# Patient Record
Sex: FEMALE | Race: WHITE | Employment: OTHER | URBAN - NONMETROPOLITAN AREA
[De-identification: names, ages, dates, MRNs, and addresses within clinical notes are randomized per-mention and may not be internally consistent; named-entity substitution may affect disease eponyms.]

---

## 2022-03-16 LAB
BASOPHILS ABSOLUTE: 0 /ΜL
BASOPHILS RELATIVE PERCENT: 0.1 %
EOSINOPHILS ABSOLUTE: 0 /ΜL
EOSINOPHILS RELATIVE PERCENT: 0 %
HCT VFR BLD CALC: 40 % (ref 36–46)
HEMOGLOBIN: 13.4 G/DL (ref 12–16)
LYMPHOCYTES ABSOLUTE: 1 /ΜL
LYMPHOCYTES RELATIVE PERCENT: 6.4 %
MCH RBC QN AUTO: 28.8 PG
MCHC RBC AUTO-ENTMCNC: 33.2 G/DL
MCV RBC AUTO: 86.9 FL
MONOCYTES ABSOLUTE: 0.9 /ΜL
MONOCYTES RELATIVE PERCENT: 5.8 %
NEUTROPHILS ABSOLUTE: 13.8 /ΜL
NEUTROPHILS RELATIVE PERCENT: 87.5 %
PDW BLD-RTO: 13 %
PLATELET # BLD: 267 K/ΜL
PMV BLD AUTO: 10 FL
RBC # BLD: 4.65 10^6/ΜL
WBC # BLD: 15.7 10^3/ML

## 2022-06-20 LAB
BASOPHILS ABSOLUTE: 0 /ΜL
BASOPHILS RELATIVE PERCENT: 0.5 %
BUN BLDV-MCNC: 22 MG/DL
CALCIUM SERPL-MCNC: 9.5 MG/DL
CHLORIDE BLD-SCNC: 106 MMOL/L
CO2: 24 MMOL/L
CREAT SERPL-MCNC: 1.32 MG/DL
EOSINOPHILS ABSOLUTE: 0.1 /ΜL
EOSINOPHILS RELATIVE PERCENT: 1.9 %
GFR CALCULATED: 43
GLUCOSE BLD-MCNC: 92 MG/DL
HCT VFR BLD CALC: 36.7 % (ref 36–46)
HEMOGLOBIN: 12.5 G/DL (ref 12–16)
LYMPHOCYTES ABSOLUTE: 2.3 /ΜL
LYMPHOCYTES RELATIVE PERCENT: 29.8 %
MCH RBC QN AUTO: 29.4 PG
MCHC RBC AUTO-ENTMCNC: 34 G/DL
MCV RBC AUTO: 86.3 FL
MONOCYTES ABSOLUTE: 0.6 /ΜL
MONOCYTES RELATIVE PERCENT: 7.8 %
NEUTROPHILS ABSOLUTE: 4.7 /ΜL
NEUTROPHILS RELATIVE PERCENT: 60 %
PDW BLD-RTO: 13.9 %
PLATELET # BLD: 200 K/ΜL
PMV BLD AUTO: 8.2 FL
POTASSIUM SERPL-SCNC: 3.9 MMOL/L
RBC # BLD: 4.25 10^6/ΜL
SODIUM BLD-SCNC: 139 MMOL/L
WBC # BLD: 7.8 10^3/ML

## 2022-08-25 NOTE — PROGRESS NOTES
[address] [place] [id number]  Dept: [phone]  Dept Fax: [phone]  Loc: [phone]    Estell Nissen is a 70 y.o. female who presents today for her medical conditions/complaints as noted below. Chief Complaint   Patient presents with    New Patient       HPI:     Patient is here in the office today to establish care. Previous PCP:   Specialists: hematology - osteoporosis, ENT/audiology - left ear hearing loss/hearing aid, pulmonology - COPD, counseling - mood/bipolar disorder, ophthalmology -cataracts, orthopedics -knee osteoarthritis/steroid injections, podiatry, GI - colonoscopy (due 11/2022)    Past medical history: osteoporosis, COPD, bipolar disorder/mood disorder, left ear hearing loss, osteoarthritis, cataracts    Social history:   - Tobacco: denies; former smoker  - Alcohol: former use; none currently  - Marijuana: denies  - Other drugs: denies  - Household: lives at home by herself    Preventative care:   Mammogram: UTD per patient; completes through [facility]  Pap exam: declines  Colonoscopy: follows with GI - due 11/2022  DEXA scan: completes through hematology/oncology office    Anxiety/depression/bipolar disorder:  Patient states that her moods are really well controlled on medication. Has had issues with mood swings and anger when not on medication. She is taking Zoloft 50 mg daily daily without issues - needs medication refill. Denies SI or HI at this time. Does follow with counseling routinely. Osteoporosis:  Patient follows with oncology/hematology center for Reclast infusions. Has DEXA scan scheduled soon for monitoring. COPD:  States that her breathing is stable. Follows with pulmonology in [place]. No new concerns at this time. Hearing issues:  States that she has a history of left-sided hearing loss and does not wear a hearing aid in her left ear.   Follows with ENT and audiology for this issue. Osteoarthritis:  Has a history of knee osteoarthritis for which she receives steroid injections. States that this does well at controlling her pain. Follows with orthopedics (Dr. Alyssa Banks). Cataracts: Follows with ophthalmology (Dr. Armando Bang) for routine management of her cataracts. Past Medical History:   Diagnosis Date    Osteoporosis       Past Surgical History:   Procedure Laterality Date    CARPAL TUNNEL RELEASE Bilateral     CATARACT EXTRACTION Bilateral        History reviewed. No pertinent family history. Social History     Tobacco Use    Smoking status: Former     Packs/day: 1.00     Years: 15.00     Pack years: 15.00     Types: Cigarettes     Start date: 8/15/1967     Quit date: 8/2/2012     Years since quitting: 10.0    Smokeless tobacco: Never   Substance Use Topics    Alcohol use: Not on file      Current Outpatient Medications   Medication Sig Dispense Refill    albuterol sulfate HFA (PROVENTIL;VENTOLIN;PROAIR) 108 (90 Base) MCG/ACT inhaler inhale 2 puffs by mouth and INTO THE LUNGS every 6 hours      ANORO ELLIPTA 62.5-25 MCG/INH AEPB inhaler inhale 1 puff by mouth and INTO THE LUNGS once daily      aspirin 81 MG chewable tablet Take 81 mg by mouth daily      Cholecalciferol (VITAMIN D3) 50 MCG (2000 UT) CAPS Take by mouth      b complex vitamins capsule Take 1 capsule by mouth daily      acetaminophen (TYLENOL) 325 MG tablet Take 650 mg by mouth every 6 hours as needed for Pain      olopatadine (PATANOL) 0.1 % ophthalmic solution 1 drop 2 times daily      meclizine (ANTIVERT) 25 MG tablet Take 25 mg by mouth 3 times daily as needed      sertraline (ZOLOFT) 50 MG tablet Take 1 tablet by mouth daily 90 tablet 0     No current facility-administered medications for this visit.      No Known Allergies    Health Maintenance   Topic Date Due    Annual Wellness Visit (AWV)  Never done    COVID-19 Vaccine (1) Never done    Depression Screen  Never done    Hepatitis C screen  Never done    DTaP/Tdap/Td vaccine (1 - Tdap) Never done    Lipids  Never done    Colorectal Cancer Screen  Never done    Breast cancer screen  Never done    Shingles vaccine (1 of 2) Never done    DEXA (modify frequency per FRAX score)  Never done    Pneumococcal 65+ years Vaccine (1 - PCV) Never done    Flu vaccine (1) 09/01/2022    Hepatitis A vaccine  Aged Out    Hepatitis B vaccine  Aged Out    Hib vaccine  Aged Out    Meningococcal (ACWY) vaccine  Aged Out       Subjective:      Review of Systems   Constitutional:  Negative for chills and fever. Respiratory:  Negative for cough and shortness of breath. Cardiovascular:  Negative for chest pain and leg swelling. Gastrointestinal:  Negative for abdominal pain, nausea and vomiting. Neurological:  Negative for dizziness and light-headedness. Psychiatric/Behavioral:  Negative for agitation, dysphoric mood, self-injury and suicidal ideas. The patient is not nervous/anxious. Objective:     Physical Exam  Vitals and nursing note reviewed. Constitutional:       General: She is not in acute distress. Appearance: Normal appearance. She is overweight. She is not ill-appearing or toxic-appearing. HENT:      Head: Normocephalic and atraumatic. Right Ear: External ear normal.      Left Ear: External ear normal.      Ears:      Comments: Hearing aid present in left ear     Nose: Nose normal.      Mouth/Throat:      Lips: Pink. Mouth: Mucous membranes are moist.      Pharynx: Oropharynx is clear. Uvula midline. Comments: edentulous  Eyes:      General: Lids are normal.      Conjunctiva/sclera: Conjunctivae normal.      Pupils: Pupils are equal, round, and reactive to light. Neck:      Thyroid: No thyromegaly. Cardiovascular:      Rate and Rhythm: Normal rate and regular rhythm. Heart sounds: Normal heart sounds. No murmur heard.   Pulmonary:      Effort: Pulmonary effort is normal.      Breath sounds: Normal breath sounds and air entry. No wheezing, rhonchi or rales. Musculoskeletal:      Cervical back: Neck supple. Right lower leg: No edema. Left lower leg: No edema. Lymphadenopathy:      Cervical: No cervical adenopathy. Skin:     General: Skin is warm and dry. Capillary Refill: Capillary refill takes less than 2 seconds. Neurological:      General: No focal deficit present. Mental Status: She is alert and oriented to person, place, and time. Gait: Gait is intact. Psychiatric:         Mood and Affect: Mood and affect normal.         Behavior: Behavior is cooperative. /60   Pulse 68   Temp 96.9 °F (36.1 °C) (Infrared)   Resp 18   Ht 5' 5\" (1.651 m)   Wt 155 lb 3.2 oz (70.4 kg)   LMP  (LMP Unknown)   SpO2 99%   BMI 25.83 kg/m²     Assessment/Plan:   Bárbara Leo was seen today for new patient. Diagnoses and all orders for this visit:    Chronic obstructive pulmonary disease, unspecified COPD type ([facility]  Comments:  Chronic, stable. Continue as needed albuterol and daily Anoro Ellipta. Follow-up with pulmonology as scheduled. Plan to obtain basic lab work as ordered below. Orders:  -     CBC with Auto Differential; Future  -     Comprehensive Metabolic Panel; Future    Mood disorder (HCC)  Comments:  History of bipolar disorder, though moods are stable on sertraline 50 mg daily. No SI or HI. Continue medication as prescribed. Follow-up with counseling as scheduled. Orders:  -     sertraline (ZOLOFT) 50 MG tablet; Take 1 tablet by mouth daily    Primary osteoarthritis of both knees  Comments:  Chronic, stable. Follow-up with orthopedics as scheduled for corticosteroid injections. Age-related osteoporosis without current pathological fracture  Comments:  Established, stable. Follow-up with hematology/oncology for Reclast infusions. Has upcoming DEXA scan scheduled. Cataract of both eyes, unspecified cataract type  Comments:  Established, stable.   Follow-up with ophthalmology as scheduled. Hearing loss of left ear, unspecified hearing loss type  Comments:  Established, stable. Wears hearing aid in left ear. Follow-up with ENT and audiology as scheduled. Screening for HIV (human immunodeficiency virus)  Comments:  HIV screening lab ordered. Orders:  -     HIV Screen; Future    Need for hepatitis C screening test  Comments:  Hepatitis C screening lab ordered. Orders:  -     Hepatitis C Antibody; Future    Lipid screening  Comments:  Fasting lipid screening ordered. Orders:  -     Lipid Panel; Future    Follow-up with specialist as detailed above. Return in about 4 weeks (around 9/27/2022) for lab review.     Electronically signed by Jimmy Keyes DO on 8/30/2022 at 2:09 PM

## 2022-08-30 ENCOUNTER — OFFICE VISIT (OUTPATIENT)
Dept: FAMILY MEDICINE CLINIC | Age: 72
End: 2022-08-30
Payer: MEDICARE

## 2022-08-30 VITALS
OXYGEN SATURATION: 99 % | WEIGHT: 155.2 LBS | TEMPERATURE: 96.9 F | RESPIRATION RATE: 18 BRPM | HEIGHT: 65 IN | DIASTOLIC BLOOD PRESSURE: 60 MMHG | BODY MASS INDEX: 25.86 KG/M2 | SYSTOLIC BLOOD PRESSURE: 122 MMHG | HEART RATE: 68 BPM

## 2022-08-30 DIAGNOSIS — Z11.4 SCREENING FOR HIV (HUMAN IMMUNODEFICIENCY VIRUS): ICD-10-CM

## 2022-08-30 DIAGNOSIS — H91.92 HEARING LOSS OF LEFT EAR, UNSPECIFIED HEARING LOSS TYPE: ICD-10-CM

## 2022-08-30 DIAGNOSIS — Z11.59 NEED FOR HEPATITIS C SCREENING TEST: ICD-10-CM

## 2022-08-30 DIAGNOSIS — Z13.220 LIPID SCREENING: ICD-10-CM

## 2022-08-30 DIAGNOSIS — M17.0 PRIMARY OSTEOARTHRITIS OF BOTH KNEES: ICD-10-CM

## 2022-08-30 DIAGNOSIS — F39 MOOD DISORDER (HCC): ICD-10-CM

## 2022-08-30 DIAGNOSIS — H26.9 CATARACT OF BOTH EYES, UNSPECIFIED CATARACT TYPE: ICD-10-CM

## 2022-08-30 DIAGNOSIS — J44.9 CHRONIC OBSTRUCTIVE PULMONARY DISEASE, UNSPECIFIED COPD TYPE (HCC): Primary | ICD-10-CM

## 2022-08-30 DIAGNOSIS — Z87.891 FORMER SMOKER: ICD-10-CM

## 2022-08-30 DIAGNOSIS — M81.0 AGE-RELATED OSTEOPOROSIS WITHOUT CURRENT PATHOLOGICAL FRACTURE: ICD-10-CM

## 2022-08-30 PROCEDURE — G0444 DEPRESSION SCREEN ANNUAL: HCPCS | Performed by: STUDENT IN AN ORGANIZED HEALTH CARE EDUCATION/TRAINING PROGRAM

## 2022-08-30 PROCEDURE — 3023F SPIROM DOC REV: CPT | Performed by: STUDENT IN AN ORGANIZED HEALTH CARE EDUCATION/TRAINING PROGRAM

## 2022-08-30 PROCEDURE — 1090F PRES/ABSN URINE INCON ASSESS: CPT | Performed by: STUDENT IN AN ORGANIZED HEALTH CARE EDUCATION/TRAINING PROGRAM

## 2022-08-30 PROCEDURE — G8417 CALC BMI ABV UP PARAM F/U: HCPCS | Performed by: STUDENT IN AN ORGANIZED HEALTH CARE EDUCATION/TRAINING PROGRAM

## 2022-08-30 PROCEDURE — G8400 PT W/DXA NO RESULTS DOC: HCPCS | Performed by: STUDENT IN AN ORGANIZED HEALTH CARE EDUCATION/TRAINING PROGRAM

## 2022-08-30 PROCEDURE — 99204 OFFICE O/P NEW MOD 45 MIN: CPT | Performed by: STUDENT IN AN ORGANIZED HEALTH CARE EDUCATION/TRAINING PROGRAM

## 2022-08-30 PROCEDURE — 1036F TOBACCO NON-USER: CPT | Performed by: STUDENT IN AN ORGANIZED HEALTH CARE EDUCATION/TRAINING PROGRAM

## 2022-08-30 PROCEDURE — 3017F COLORECTAL CA SCREEN DOC REV: CPT | Performed by: STUDENT IN AN ORGANIZED HEALTH CARE EDUCATION/TRAINING PROGRAM

## 2022-08-30 PROCEDURE — G8427 DOCREV CUR MEDS BY ELIG CLIN: HCPCS | Performed by: STUDENT IN AN ORGANIZED HEALTH CARE EDUCATION/TRAINING PROGRAM

## 2022-08-30 PROCEDURE — 1123F ACP DISCUSS/DSCN MKR DOCD: CPT | Performed by: STUDENT IN AN ORGANIZED HEALTH CARE EDUCATION/TRAINING PROGRAM

## 2022-08-30 RX ORDER — UMECLIDINIUM BROMIDE AND VILANTEROL TRIFENATATE 62.5; 25 UG/1; UG/1
POWDER RESPIRATORY (INHALATION)
COMMUNITY
Start: 2022-08-01

## 2022-08-30 RX ORDER — ASPIRIN 81 MG/1
81 TABLET, CHEWABLE ORAL DAILY
COMMUNITY

## 2022-08-30 RX ORDER — MECLIZINE HYDROCHLORIDE 25 MG/1
25 TABLET ORAL 3 TIMES DAILY PRN
COMMUNITY

## 2022-08-30 RX ORDER — METHOCARBAMOL 750 MG/1
TABLET, FILM COATED ORAL
COMMUNITY
Start: 2022-06-20 | End: 2022-08-30

## 2022-08-30 RX ORDER — METHYLPREDNISOLONE 4 MG/1
TABLET ORAL
COMMUNITY
Start: 2022-06-20 | End: 2022-08-30

## 2022-08-30 RX ORDER — ACETAMINOPHEN 325 MG/1
650 TABLET ORAL EVERY 6 HOURS PRN
COMMUNITY

## 2022-08-30 RX ORDER — ACETAMINOPHEN 160 MG
TABLET,DISINTEGRATING ORAL
COMMUNITY

## 2022-08-30 RX ORDER — HYDROXYZINE PAMOATE 25 MG/1
CAPSULE ORAL
COMMUNITY
Start: 2022-07-05 | End: 2022-08-30

## 2022-08-30 RX ORDER — OLOPATADINE HYDROCHLORIDE 1 MG/ML
1 SOLUTION/ DROPS OPHTHALMIC 2 TIMES DAILY
COMMUNITY

## 2022-08-30 RX ORDER — CARIPRAZINE 1.5 MG/1
CAPSULE, GELATIN COATED ORAL
COMMUNITY
Start: 2022-07-06 | End: 2022-08-30

## 2022-08-30 RX ORDER — VITAMIN B COMPLEX
1 CAPSULE ORAL DAILY
COMMUNITY

## 2022-08-30 RX ORDER — ALBUTEROL SULFATE 90 UG/1
AEROSOL, METERED RESPIRATORY (INHALATION)
COMMUNITY
Start: 2022-07-05

## 2022-08-30 SDOH — ECONOMIC STABILITY: FOOD INSECURITY: WITHIN THE PAST 12 MONTHS, YOU WORRIED THAT YOUR FOOD WOULD RUN OUT BEFORE YOU GOT MONEY TO BUY MORE.: NEVER TRUE

## 2022-08-30 SDOH — ECONOMIC STABILITY: FOOD INSECURITY: WITHIN THE PAST 12 MONTHS, THE FOOD YOU BOUGHT JUST DIDN'T LAST AND YOU DIDN'T HAVE MONEY TO GET MORE.: NEVER TRUE

## 2022-08-30 ASSESSMENT — PATIENT HEALTH QUESTIONNAIRE - PHQ9
SUM OF ALL RESPONSES TO PHQ QUESTIONS 1-9: 0
2. FEELING DOWN, DEPRESSED OR HOPELESS: 0
1. LITTLE INTEREST OR PLEASURE IN DOING THINGS: 0
SUM OF ALL RESPONSES TO PHQ QUESTIONS 1-9: 0
SUM OF ALL RESPONSES TO PHQ QUESTIONS 1-9: 0
SUM OF ALL RESPONSES TO PHQ9 QUESTIONS 1 & 2: 0
SUM OF ALL RESPONSES TO PHQ QUESTIONS 1-9: 0

## 2022-08-30 ASSESSMENT — ENCOUNTER SYMPTOMS
VOMITING: 0
ABDOMINAL PAIN: 0
SHORTNESS OF BREATH: 0
NAUSEA: 0
COUGH: 0

## 2022-08-30 ASSESSMENT — SOCIAL DETERMINANTS OF HEALTH (SDOH): HOW HARD IS IT FOR YOU TO PAY FOR THE VERY BASICS LIKE FOOD, HOUSING, MEDICAL CARE, AND HEATING?: NOT HARD AT ALL

## 2022-09-19 LAB
ANTIBODY: NEGATIVE
BASOPHILS ABSOLUTE: 0 /ΜL
BASOPHILS RELATIVE PERCENT: 0.4 %
CHOLESTEROL, TOTAL: 184 MG/DL
CHOLESTEROL/HDL RATIO: NORMAL
EOSINOPHILS ABSOLUTE: 0.1 /ΜL
EOSINOPHILS RELATIVE PERCENT: 2.1 %
HCT VFR BLD CALC: 40.2 % (ref 36–46)
HDLC SERPL-MCNC: 54 MG/DL (ref 35–70)
HEMOGLOBIN: 13.5 G/DL (ref 12–16)
HIV AG/AB: NEGATIVE
LDL CHOLESTEROL CALCULATED: 106 MG/DL (ref 0–160)
LYMPHOCYTES ABSOLUTE: 2.2 /ΜL
LYMPHOCYTES RELATIVE PERCENT: 32.2 %
MCH RBC QN AUTO: 29 PG
MCHC RBC AUTO-ENTMCNC: 33.6 G/DL
MCV RBC AUTO: 86.4 FL
MONOCYTES ABSOLUTE: 0.6 /ΜL
MONOCYTES RELATIVE PERCENT: 8.8 %
NEUTROPHILS ABSOLUTE: 3.9 /ΜL
NEUTROPHILS RELATIVE PERCENT: 56.5 %
NONHDLC SERPL-MCNC: NORMAL MG/DL
PDW BLD-RTO: 13.4 %
PLATELET # BLD: 220 K/ΜL
PMV BLD AUTO: 7.7 FL
RBC # BLD: 4.65 10^6/ΜL
TRIGL SERPL-MCNC: 121 MG/DL
VLDLC SERPL CALC-MCNC: 24 MG/DL
WBC # BLD: 6.9 10^3/ML

## 2022-09-26 NOTE — PROGRESS NOTES
[address] [address]  Dept: [phone]  Dept Fax: [phone]  Loc: [phone]    Lan Mullins is a 70 y.o. female who presents today for her medical conditions/complaints as noted below. Chief Complaint   Patient presents with    1 Month Follow-Up     Discuss labs    Discuss Medications     Patient states that her medications are making her sleepy       HPI:     Patient presents to the office today for a lab review. Labs showed normal CBC and negative HIV and hepatitis C screening. Cholesterol panel - total 184, HDL 54, , and  (ASCVD risk is 11%). Vitamin D level and CMP were not drawn. Reviewing previous labs from 6/2022, I can see that patient had a low GFR of 43. Patient is unaware of previous kidney issues or being told this in the past.    Patient thinks that her sertraline 50 mg is working well but is making her tired throughout the day. She would like to go back down on dose - 25 mg did not control her moods as well. Would like to try a dose in between. No SI or HI. Patient also has a history of thyroid nodules-was seen on previous thyroid ultrasound in 5/2020. Recommended 1 year follow-up at that time, but patient states that she did not know she was supposed to follow-up with another ultrasound. Preventative care:  Colon cancer screening: Due 11/2022-follows with GI      Past Medical History:   Diagnosis Date    Osteoporosis       Past Surgical History:   Procedure Laterality Date    CARPAL TUNNEL RELEASE Bilateral     CATARACT EXTRACTION Bilateral        History reviewed. No pertinent family history.     Social History     Tobacco Use    Smoking status: Former     Packs/day: 1.00     Years: 15.00     Pack years: 15.00     Types: Cigarettes     Start date: 8/15/1967     Quit date: 8/2/2012     Years since quitting: 10.1    Smokeless tobacco: Never   Substance Use Meningococcal (ACWY) vaccine  Aged Out       Subjective:      Review of Systems   Constitutional:  Positive for fatigue (sleeping more on higher dose of sertraline). Negative for chills and fever. Respiratory:  Negative for cough and shortness of breath. Cardiovascular:  Negative for chest pain. Neurological:  Negative for dizziness, light-headedness and headaches. Psychiatric/Behavioral:  Negative for dysphoric mood (controlled on medication), self-injury, sleep disturbance and suicidal ideas. Objective:     Physical Exam  Vitals and nursing note reviewed. Constitutional:       General: She is not in acute distress. Appearance: Normal appearance. She is well-developed and overweight. She is not ill-appearing or toxic-appearing. HENT:      Head: Normocephalic and atraumatic. Mouth/Throat:      Lips: Pink. Mouth: Mucous membranes are moist.      Comments: edentulous  Eyes:      General: Lids are normal.      Conjunctiva/sclera: Conjunctivae normal.   Cardiovascular:      Rate and Rhythm: Normal rate and regular rhythm. Heart sounds: Normal heart sounds. No murmur heard. Comments: Wearing compression hose  Pulmonary:      Effort: Pulmonary effort is normal.      Breath sounds: Normal breath sounds and air entry. Musculoskeletal:      Right lower leg: No edema. Left lower leg: No edema. Skin:     General: Skin is warm and dry. Neurological:      General: No focal deficit present. Mental Status: She is alert and oriented to person, place, and time. Gait: Gait is intact. Psychiatric:         Mood and Affect: Mood and affect normal.         Behavior: Behavior is cooperative.      /66 (Site: Right Upper Arm, Position: Sitting, Cuff Size: Medium Adult)   Pulse 83   Temp 97 °F (36.1 °C) (Temporal)   Resp 18   Ht 5' 5\" (1.651 m)   Wt 154 lb 6.4 oz (70 kg)   LMP  (LMP Unknown)   SpO2 98% Comment: Room Air  BMI 25.69 kg/m²     Assessment/Plan: Yoseph Alvarez was seen today for 1 month follow-up and discuss medications. Diagnoses and all orders for this visit:    Multiple thyroid nodules  Comments:  History of 2 documented thyroid nodules (TR1 and TR4?) on thyroid ultrasound in 5/2020. Recommended 1 year follow-up at that time. Plan to obtain repeat thyroid US at this time to monitor. Orders:  -     US THYROID; Future    Mood disorder ([facility]  Comments:  Established, stable. No SI or HI. Moods are well controlled on sertraline 50 mg daily, but patient is fatigued on higher dose. Patient requesting lower dose, though 25 mg does not control mood well. We will plan to decrease dose to 37.5 mg daily (1 and 1/2 tablet of 25 mg daily). Patient advised to contact office if moods are uncontrolled or if fatigue/sleepiness does not improve. Orders:  -     sertraline (ZOLOFT) 25 MG tablet; Take 1 and 1/2 tablets by mouth every morning    Decreased GFR  Comments:  Low GFR of 43 noted on previous labs in 6/2022. Etiology unclear as patient had normal kidney function in 10/2021. She does not take NSAIDs and drinks about 60 ounces of water daily. CMP was not drawn from outside facility at recent lab draw. Plan to recheck kidney function today. Orders:  -     Comprehensive Metabolic Panel; Future  -     Comprehensive Metabolic Panel    Vitamin D deficiency  Comments:  Established issue. Vitamin D level was not checked at recent lab draw. Plan to obtain today. Orders:  -     Vitamin D 25 Hydroxy; Future  -     Vitamin D 25 Hydroxy      Return in about 3 months (around 12/27/2022) for follow up on moods.     Electronically signed by Liyah Marroquin DO on 9/27/2022 at 10:43 AM

## 2022-09-27 ENCOUNTER — OFFICE VISIT (OUTPATIENT)
Dept: FAMILY MEDICINE CLINIC | Age: 72
End: 2022-09-27
Payer: MEDICARE

## 2022-09-27 VITALS
HEART RATE: 83 BPM | SYSTOLIC BLOOD PRESSURE: 132 MMHG | HEIGHT: 65 IN | TEMPERATURE: 97 F | OXYGEN SATURATION: 98 % | WEIGHT: 154.4 LBS | RESPIRATION RATE: 18 BRPM | DIASTOLIC BLOOD PRESSURE: 66 MMHG | BODY MASS INDEX: 25.72 KG/M2

## 2022-09-27 DIAGNOSIS — F39 MOOD DISORDER (HCC): ICD-10-CM

## 2022-09-27 DIAGNOSIS — R94.4 DECREASED GFR: ICD-10-CM

## 2022-09-27 DIAGNOSIS — E55.9 VITAMIN D DEFICIENCY: ICD-10-CM

## 2022-09-27 DIAGNOSIS — E04.2 MULTIPLE THYROID NODULES: Primary | ICD-10-CM

## 2022-09-27 PROBLEM — M17.9 OSTEOARTHRITIS OF KNEE: Status: ACTIVE | Noted: 2022-09-27

## 2022-09-27 LAB
ALBUMIN SERPL-MCNC: 5 G/DL (ref 3.5–5.1)
ALP BLD-CCNC: 64 U/L (ref 38–126)
ALT SERPL-CCNC: 19 U/L (ref 11–66)
ANION GAP SERPL CALCULATED.3IONS-SCNC: 14 MEQ/L (ref 8–16)
AST SERPL-CCNC: 23 U/L (ref 5–40)
BILIRUB SERPL-MCNC: 0.6 MG/DL (ref 0.3–1.2)
BUN BLDV-MCNC: 16 MG/DL (ref 7–22)
CALCIUM SERPL-MCNC: 10.3 MG/DL (ref 8.5–10.5)
CHLORIDE BLD-SCNC: 101 MEQ/L (ref 98–111)
CO2: 24 MEQ/L (ref 23–33)
CREAT SERPL-MCNC: 0.8 MG/DL (ref 0.4–1.2)
GFR SERPL CREATININE-BSD FRML MDRD: 71 ML/MIN/1.73M2
GLUCOSE BLD-MCNC: 111 MG/DL (ref 70–108)
POTASSIUM SERPL-SCNC: 4.5 MEQ/L (ref 3.5–5.2)
SODIUM BLD-SCNC: 139 MEQ/L (ref 135–145)
TOTAL PROTEIN: 7.4 G/DL (ref 6.1–8)
VITAMIN D 25-HYDROXY: 72 NG/ML (ref 30–100)

## 2022-09-27 PROCEDURE — 99214 OFFICE O/P EST MOD 30 MIN: CPT | Performed by: STUDENT IN AN ORGANIZED HEALTH CARE EDUCATION/TRAINING PROGRAM

## 2022-09-27 PROCEDURE — 1123F ACP DISCUSS/DSCN MKR DOCD: CPT | Performed by: STUDENT IN AN ORGANIZED HEALTH CARE EDUCATION/TRAINING PROGRAM

## 2022-09-27 RX ORDER — SERTRALINE HYDROCHLORIDE 25 MG/1
TABLET, FILM COATED ORAL
Qty: 45 TABLET | Refills: 2 | Status: SHIPPED | OUTPATIENT
Start: 2022-09-27

## 2022-09-27 ASSESSMENT — ENCOUNTER SYMPTOMS
COUGH: 0
SHORTNESS OF BREATH: 0

## 2022-09-28 ENCOUNTER — TELEPHONE (OUTPATIENT)
Dept: FAMILY MEDICINE CLINIC | Age: 72
End: 2022-09-28

## 2022-09-28 NOTE — TELEPHONE ENCOUNTER
----- Message from Dinh Anguiano DO sent at 9/27/2022  4:49 PM EDT -----  Please call patient and let her know that her labs showed normal vitamin D level and normal kidney function. No changes are needed at this time.   Thanks    Nguyễn Dewitt DO Affect and characteristics of appearance, verbalizations, behaviors are appropriate negative

## 2022-10-17 ENCOUNTER — TELEPHONE (OUTPATIENT)
Dept: FAMILY MEDICINE CLINIC | Age: 72
End: 2022-10-17

## 2022-10-17 DIAGNOSIS — E04.2 MULTIPLE THYROID NODULES: ICD-10-CM

## 2022-10-17 NOTE — TELEPHONE ENCOUNTER
Patient called back and US results given. Pt will hold off on referral at this time if things have not changed since her previous US.

## 2022-10-17 NOTE — TELEPHONE ENCOUNTER
----- Message from Deacon Martines DO sent at 10/17/2022  2:55 PM EDT -----  Please call patient and let her know that her thyroid ultrasound showed 3 thyroid nodules that do not need further evaluation at this time; however, if she wants to see an endocrinologist to discuss these, I am happy to place a referral. Please let me know.  Thanks    Dr. Manolo Dunlap

## 2022-11-02 ENCOUNTER — TELEPHONE (OUTPATIENT)
Dept: FAMILY MEDICINE CLINIC | Age: 72
End: 2022-11-02

## 2022-11-02 NOTE — TELEPHONE ENCOUNTER
----- Message from Linda Nash DO sent at 11/2/2022  7:56 AM EDT -----  Please reach out to patient and find out how she is doing on the her zoloft medication since we changed the dose at last visit. Are her moods doing OK? Let me know.  Thanks    Dr. Michael Kenny

## 2022-11-03 NOTE — TELEPHONE ENCOUNTER
Spoke with patient- she states that she is seeing improvements with the increased dose. doing ok overall. Lisandra Caceres

## 2022-12-27 NOTE — PROGRESS NOTES
[address] [address]  Dept: [phone]  Dept Fax: [phone]  Loc: [phone]    Scarlet Dangelo is a 67 y.o. female who presents today for her medical conditions/complaints as noted below. Chief Complaint   Patient presents with    3 Month Follow-Up     F/u testing- colonoscopy, PFT, sleep study, and blood work      HPI:     Patient presents to the office today for a follow up on chronic medical conditions. She states that she has been doing well overall. No new concerns or complaints at this time. Moods:  Patient is taking sertraline 37.5 mg daily without issues. Moods are well controlled on current dosing. Patient does need medication refill today. Denies SI or HI. Preventative care: Tdap vaccine: due; planning to get at pharmacy  Colonoscopy: 12/14/2022 -- normal; was told she does not need anymore    Patient follows with pulmonology for pre-COPD and [facility] -- recommended repeat LDCT in 6/2023. Follows with hematology/oncology for osteoporosis -- on reclast infusions? Patient reports that her DEXA scan is up-to-date but she will look into this. Past Medical History:   Diagnosis Date    Osteoporosis       Past Surgical History:   Procedure Laterality Date    CARPAL TUNNEL RELEASE Bilateral     CATARACT EXTRACTION Bilateral        History reviewed. No pertinent family history.     Social History     Tobacco Use    Smoking status: Former     Packs/day: 1.00     Years: 15.00     Pack years: 15.00     Types: Cigarettes     Start date: 8/15/1967     Quit date: 8/2/2012     Years since quitting: 10.4    Smokeless tobacco: Never   Substance Use Topics    Alcohol use: Not on file      Current Outpatient Medications   Medication Sig Dispense Refill    sertraline (ZOLOFT) 25 MG tablet Take 1 and 1/2 tablets by mouth every morning 135 tablet 1    albuterol sulfate HFA (PROVENTIL;VENTOLIN;PROAIR) 108 (90 Base) MCG/ACT inhaler inhale 2 puffs by mouth and INTO THE LUNGS every 6 hours      ANORO ELLIPTA 62.5-25 MCG/INH AEPB inhaler inhale 1 puff by mouth and INTO THE LUNGS once daily      aspirin 81 MG chewable tablet Take 81 mg by mouth daily      Cholecalciferol (VITAMIN D3) 50 MCG (2000 UT) CAPS Take by mouth      b complex vitamins capsule Take 1 capsule by mouth daily      acetaminophen (TYLENOL) 325 MG tablet Take 650 mg by mouth every 6 hours as needed for Pain      olopatadine (PATANOL) 0.1 % ophthalmic solution 1 drop 2 times daily      meclizine (ANTIVERT) 25 MG tablet Take 25 mg by mouth 3 times daily as needed      fluticasone (FLONASE) 50 MCG/ACT nasal spray INSTILL 2 SPRAYS INTO EACH NOSTRIL EVERY MORNING       No current facility-administered medications for this visit. Allergies   Allergen Reactions    Amitriptyline Hcl Other (See Comments)    Bactrim [Sulfamethoxazole-Trimethoprim]     Cephalexin Other (See Comments)    Chlorpromazine Hcl Other (See Comments)     Makes her feel loopy/intoxicated    Erythromycin     Iodine Other (See Comments)    Lamotrigine Other (See Comments)    Neurontin [Gabapentin]     Risperidone Other (See Comments)       Health Maintenance   Topic Date Due    Colorectal Cancer Screen  Never done    DEXA (modify frequency per FRAX score)  Never done    COVID-19 Vaccine (4 - Booster for Crouch Peter series) 12/22/2021    Annual Wellness Visit (AWV)  Never done    DTaP/Tdap/Td vaccine (2 - Td or Tdap) 10/04/2022    Depression Screen  08/30/2023    Breast cancer screen  11/21/2024    Lipids  09/19/2027    Flu vaccine  Completed    Shingles vaccine  Completed    Pneumococcal 65+ years Vaccine  Completed    Hepatitis C screen  Completed    Hepatitis A vaccine  Aged Out    Hib vaccine  Aged Out    Meningococcal (ACWY) vaccine  Aged Out       Subjective:      Review of Systems   Constitutional:  Negative for chills and fever.    Respiratory:  Negative for cough and shortness of breath. Cardiovascular:  Negative for chest pain. Neurological:  Negative for dizziness, light-headedness and headaches. Psychiatric/Behavioral:  Negative for dysphoric mood, self-injury and suicidal ideas. The patient is not nervous/anxious. Objective:     Physical Exam  Vitals and nursing note reviewed. Constitutional:       General: She is not in acute distress. Appearance: Normal appearance. She is well-developed. She is not ill-appearing. HENT:      Mouth/Throat:      Lips: Pink. Mouth: Mucous membranes are moist.   Eyes:      General: Lids are normal.      Conjunctiva/sclera: Conjunctivae normal.   Cardiovascular:      Rate and Rhythm: Normal rate and regular rhythm. Heart sounds: Normal heart sounds. No murmur heard. Pulmonary:      Effort: Pulmonary effort is normal.      Breath sounds: Normal breath sounds and air entry. No wheezing, rhonchi or rales. Skin:     General: Skin is warm and dry. Neurological:      General: No focal deficit present. Mental Status: She is alert and oriented to person, place, and time. Gait: Gait is intact. Psychiatric:         Mood and Affect: Mood and affect normal.         Behavior: Behavior is cooperative. /78 (Site: Left Upper Arm, Position: Sitting, Cuff Size: Medium Adult)   Pulse 52   Resp 16   Ht 5' 5\" (1.651 m)   Wt 154 lb 3.2 oz (69.9 kg)   SpO2 98%   BMI 25.66 kg/m²     Assessment/Plan:   Kajal Barakat was seen today for 3 month follow-up. Diagnoses and all orders for this visit:    [facility]  Comments:  Established, stable. No SI or HI. Continue sertraline 37.5 mg daily. Advised to contact the office with any concerns. Orders:  -     sertraline (ZOLOFT) 25 MG tablet; Take 1 and 1/2 tablets by mouth every morning    Need for diphtheria-tetanus-pertussis (Tdap) vaccine  Plan to get Tdap vaccine at pharmacy for insurance purposes.     Patient planning to get records sent over regarding recent colonoscopy. Advised to follow up with pulmonology and hematology as scheduled. Return in about 6 months (around 6/28/2023) for AWV.     Electronically signed by Roberto Lopes DO on 12/30/2022 at 10:46 AM

## 2022-12-28 ENCOUNTER — OFFICE VISIT (OUTPATIENT)
Dept: FAMILY MEDICINE CLINIC | Age: 72
End: 2022-12-28

## 2022-12-28 VITALS
OXYGEN SATURATION: 98 % | HEART RATE: 52 BPM | WEIGHT: 154.2 LBS | DIASTOLIC BLOOD PRESSURE: 78 MMHG | SYSTOLIC BLOOD PRESSURE: 124 MMHG | BODY MASS INDEX: 25.69 KG/M2 | RESPIRATION RATE: 16 BRPM | HEIGHT: 65 IN

## 2022-12-28 DIAGNOSIS — F39 MOOD DISORDER (HCC): Primary | ICD-10-CM

## 2022-12-28 DIAGNOSIS — Z23 NEED FOR DIPHTHERIA-TETANUS-PERTUSSIS (TDAP) VACCINE: ICD-10-CM

## 2022-12-28 RX ORDER — FLUTICASONE PROPIONATE 50 MCG
SPRAY, SUSPENSION (ML) NASAL
COMMUNITY
Start: 2022-10-04

## 2022-12-28 RX ORDER — SERTRALINE HYDROCHLORIDE 25 MG/1
TABLET, FILM COATED ORAL
Qty: 135 TABLET | Refills: 1 | Status: SHIPPED | OUTPATIENT
Start: 2022-12-28

## 2022-12-30 ASSESSMENT — ENCOUNTER SYMPTOMS
SHORTNESS OF BREATH: 0
COUGH: 0

## 2023-01-18 ENCOUNTER — TELEPHONE (OUTPATIENT)
Dept: FAMILY MEDICINE CLINIC | Age: 73
End: 2023-01-18

## 2023-01-18 NOTE — TELEPHONE ENCOUNTER
There is really no good imaging to assess for this urgently outpatient. If she is still having symptoms of swelling, I would recommend that she is seen ASAP for evaluation, whether this is at urgent care or ER tonight. If she is having any tongue or mouth swelling, that is also a reason to be seen urgently tonight.     Dr. Buchanan Scot

## 2023-01-18 NOTE — TELEPHONE ENCOUNTER
----- Message from Willow Hansen sent at 1/18/2023  3:14 PM EST -----  Subject: Message to Provider    QUESTIONS  Information for Provider? pt says she went to er for throat swelling a   couple of days ago. She still feels the throat swelling days later and   feels like her throat is closing up. She is asking to get an xray or some   type of test done to see what's wrong.  ---------------------------------------------------------------------------  --------------  Gary FLORES  [phone]; OK to leave message on voicemail  ---------------------------------------------------------------------------  --------------  SCRIPT ANSWERS  Relationship to Patient?  Self

## 2023-05-11 DIAGNOSIS — F39 MOOD DISORDER (HCC): ICD-10-CM

## 2023-05-11 RX ORDER — SERTRALINE HYDROCHLORIDE 25 MG/1
TABLET, FILM COATED ORAL
Qty: 135 TABLET | Refills: 1 | Status: SHIPPED | OUTPATIENT
Start: 2023-05-11

## 2023-05-11 NOTE — TELEPHONE ENCOUNTER
Patient states that she does get other medications delivered and she will call them to set up the delivery.

## 2023-05-11 NOTE — TELEPHONE ENCOUNTER
Patient's last appointment was : 12/28/2022  Patient's next appointment is : 6/28/2023  Last sent in: 12/28/22 135 tabs with 1 refill

## 2023-05-11 NOTE — TELEPHONE ENCOUNTER
----- Message from [facility] sent at 5/11/2023 10:48 AM EDT -----  Subject: Message to Provider    QUESTIONS  Information for Provider? patient is wanting to tell the pharmacy to have   her prescriptions delivered , she is unable to get through to the pharmacy  ---------------------------------------------------------------------------  --------------  [id number]  [phone]; OK to leave message on voicemail  ---------------------------------------------------------------------------  --------------  SCRIPT ANSWERS  Relationship to Patient?  Self

## 2023-05-11 NOTE — TELEPHONE ENCOUNTER
----- Message from [facility] sent at 5/11/2023 10:47 AM EDT -----  Subject: Refill Request    QUESTIONS  Name of Medication? sertraline (ZOLOFT) 25 MG tablet  Patient-reported dosage and instructions? 1 1/2 tablets by mouth daily  How many days do you have left? 10  Preferred Pharmacy? [address] [place] [address] #[id number]  Pharmacy phone number (if available)? [phone]  Additional Information for Provider? patient has question about having a   bone density test done. could someone please follow up asap to advise ,   she is requesting a referral for testing to be done .   ---------------------------------------------------------------------------  --------------  [address]  What is the best way for the office to contact you? OK to leave message on   voicemail  Preferred Call Back Phone Number? [phone]  ---------------------------------------------------------------------------  --------------  SCRIPT ANSWERS  Relationship to Patient?  Self

## 2023-06-28 ENCOUNTER — OFFICE VISIT (OUTPATIENT)
Dept: FAMILY MEDICINE CLINIC | Age: 73
End: 2023-06-28
Payer: MEDICARE

## 2023-06-28 VITALS
DIASTOLIC BLOOD PRESSURE: 60 MMHG | HEIGHT: 65 IN | BODY MASS INDEX: 24.32 KG/M2 | RESPIRATION RATE: 16 BRPM | HEART RATE: 60 BPM | SYSTOLIC BLOOD PRESSURE: 110 MMHG | WEIGHT: 146 LBS | OXYGEN SATURATION: 96 %

## 2023-06-28 DIAGNOSIS — G89.29 CHRONIC FOOT PAIN, UNSPECIFIED LATERALITY: ICD-10-CM

## 2023-06-28 DIAGNOSIS — E55.9 VITAMIN D DEFICIENCY: ICD-10-CM

## 2023-06-28 DIAGNOSIS — F39 MOOD DISORDER (HCC): ICD-10-CM

## 2023-06-28 DIAGNOSIS — Z13.220 LIPID SCREENING: ICD-10-CM

## 2023-06-28 DIAGNOSIS — Z00.00 INITIAL MEDICARE ANNUAL WELLNESS VISIT: Primary | ICD-10-CM

## 2023-06-28 DIAGNOSIS — M79.673 CHRONIC FOOT PAIN, UNSPECIFIED LATERALITY: ICD-10-CM

## 2023-06-28 PROCEDURE — G0438 PPPS, INITIAL VISIT: HCPCS | Performed by: STUDENT IN AN ORGANIZED HEALTH CARE EDUCATION/TRAINING PROGRAM

## 2023-06-28 PROCEDURE — 1123F ACP DISCUSS/DSCN MKR DOCD: CPT | Performed by: STUDENT IN AN ORGANIZED HEALTH CARE EDUCATION/TRAINING PROGRAM

## 2023-06-28 PROCEDURE — 99213 OFFICE O/P EST LOW 20 MIN: CPT | Performed by: STUDENT IN AN ORGANIZED HEALTH CARE EDUCATION/TRAINING PROGRAM

## 2023-06-28 RX ORDER — OMEPRAZOLE 40 MG/1
40 CAPSULE, DELAYED RELEASE ORAL
COMMUNITY
Start: 2023-05-26

## 2023-06-28 ASSESSMENT — PATIENT HEALTH QUESTIONNAIRE - PHQ9
SUM OF ALL RESPONSES TO PHQ QUESTIONS 1-9: 0
2. FEELING DOWN, DEPRESSED OR HOPELESS: 0
SUM OF ALL RESPONSES TO PHQ9 QUESTIONS 1 & 2: 0
SUM OF ALL RESPONSES TO PHQ QUESTIONS 1-9: 0
1. LITTLE INTEREST OR PLEASURE IN DOING THINGS: 0

## 2023-06-28 ASSESSMENT — LIFESTYLE VARIABLES
HOW OFTEN DO YOU HAVE A DRINK CONTAINING ALCOHOL: NEVER
HOW MANY STANDARD DRINKS CONTAINING ALCOHOL DO YOU HAVE ON A TYPICAL DAY: PATIENT DOES NOT DRINK

## 2023-07-12 ENCOUNTER — CLINICAL DOCUMENTATION (OUTPATIENT)
Dept: SPIRITUAL SERVICES | Facility: CLINIC | Age: 73
End: 2023-07-12

## 2023-07-12 NOTE — ACP (ADVANCE CARE PLANNING)
Advance Care Planning   Ambulatory ACP Specialist Patient Outreach    Date:  7/12/2023  ACP Specialist:  Rosalio Hughes    Outreach call to patient in follow-up to ACP Specialist referral from: Teofilo West,     [x] PCP  [] Provider   [] Ambulatory Care Management [] Other for Reason:    [x] Advance Directive Assistance  [] Code Status Discussion  [] Complete Portable DNR Order  [x] Discuss Goals of Care  [] Complete POST/MOST  [] Early ACP Decision-Making  [] Other    Date Referral Received: 6/28/2023    Today's Outreach:  [x] First   [] Second  [] Third                               Third outreach made by []  phone  [] email []   [employer]     Intervention:  [] Spoke with Patient  [x] Left VM requesting return call      Outcome:   made an initial attempt to contact Troy Douglas via telephone call to offer support, if desired, for the completion of Advance Directives documents as part of an [address] [place] [address] conversation. No answer. Left message.  will follow-up. Next Step:   [] ACP scheduled conversation  [x] Outreach again in one week               [] Email / Mail ACP Info Sheets  [] Email / Mail Advance Directive            [] Close Referral. Routing closure to referring provider/staff and to ACP Specialist . [] Closure Letter mailed to Patient with Invitation to Contact ACP Specialist if/when ready.     Thank you for this referral.

## 2023-07-13 ENCOUNTER — TELEPHONE (OUTPATIENT)
Dept: FAMILY MEDICINE CLINIC | Age: 73
End: 2023-07-13

## 2023-07-13 DIAGNOSIS — M79.673 CHRONIC FOOT PAIN, UNSPECIFIED LATERALITY: Primary | ICD-10-CM

## 2023-07-13 DIAGNOSIS — G89.29 CHRONIC FOOT PAIN, UNSPECIFIED LATERALITY: Primary | ICD-10-CM

## 2023-07-13 NOTE — TELEPHONE ENCOUNTER
Patient called and states she was suppose to have an appt with a foot doctor at [address] [facility]. Pt states when she called them to verify time and dates and they stated she does not have an appt. She is wanting to know who she is scheduled to see.

## 2023-07-13 NOTE — TELEPHONE ENCOUNTER
----- Message from Anette Estevez sent at 7/13/2023  2:20 PM EDT -----  Subject: Message to Provider    QUESTIONS  Information for Provider? Pt states that she was told she needed to see   someone at the [facility] to see someone for a neuropathy appt. She   says she was told she was supposed to see someone in July. She has an appt   in December she isn't sure what its scheduled for, she would like to   discuss this with someone. Please call back when available.   ---------------------------------------------------------------------------  --------------  Promise FLORES  [phone]; Do not leave any message, patient will call back for answer  ---------------------------------------------------------------------------  --------------  SCRIPT ANSWERS  Relationship to Patient?  Self
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
## 2023-07-18 ENCOUNTER — CLINICAL DOCUMENTATION (OUTPATIENT)
Dept: SPIRITUAL SERVICES | Age: 73
End: 2023-07-18

## 2023-07-18 NOTE — ACP (ADVANCE CARE PLANNING)
Advance Care Planning   Ambulatory ACP Specialist Patient Outreach    Date:  7/18/2023  ACP Specialist:  SHERYL Ross    Outreach call to patient in follow-up to ACP Specialist referral from: Thalia Epps DO    [x] PCP  [] Provider   [] Ambulatory Care Management [] Other for Reason:    [x] Advance Directive Assistance  [] Code Status Discussion  [] Complete Portable DNR Order  [] Discuss Goals of Care  [] Complete POST/MOST  [x] Early ACP Decision-Making  [] Other    Date Referral Received: 6/28/23    Today's Outreach:  [] First   [x] Second  [] Third                               Third outreach made by []  phone  [] email []   [employer]     Intervention:  [] Spoke with Patient  [x] Left VM requesting return call      Outcome: ACP Specialist left message for patient. A closure letter will be sent to this patient and referral will be closed unless patient returns call. Next Step:   [] ACP scheduled conversation  [] Outreach again in one week               [] Email / Mail ACP Info Sheets  [] Email / Mail Advance Directive            [x] Close Referral. Routing closure to referring provider/staff and to ACP Specialist . [x] Closure Letter mailed to Patient with Invitation to Contact ACP Specialist if/when ready.     Thank you for this referral.

## 2023-07-24 ENCOUNTER — TELEPHONE (OUTPATIENT)
Dept: FAMILY MEDICINE CLINIC | Age: 73
End: 2023-07-24

## 2023-07-24 DIAGNOSIS — Z78.0 POST-MENOPAUSAL: ICD-10-CM

## 2023-07-24 DIAGNOSIS — M81.0 AGE-RELATED OSTEOPOROSIS WITHOUT CURRENT PATHOLOGICAL FRACTURE: Primary | ICD-10-CM

## 2023-07-24 NOTE — TELEPHONE ENCOUNTER
Patient called and stated she see's a doctor at [facility] in [place] [phone] and needs authorization from Dr. Heather Montelongo to have labs and Reclast complete again this year.

## 2023-07-24 NOTE — TELEPHONE ENCOUNTER
----- Message from Alexandrea Abreu sent at 7/24/2023  1:21 PM EDT -----  Subject: Referral Request    Reason for referral request? request and lab for [address]  Provider patient wants to be referred to(if known):     Provider Phone Number(if known): Additional Information for Provider?  Pt states she had labs done last   march they want a new referral   ---------------------------------------------------------------------------  --------------  Xi Cool INFO    [phone]; OK to leave message on voicemail  ---------------------------------------------------------------------------  --------------

## 2023-07-24 NOTE — TELEPHONE ENCOUNTER
Can you please contact hematology office and find out what all they need from my end? Not sure what labs/referral they would need if they are the ones to order her Reclast infusions.     Mark Duran, DO

## 2023-07-25 NOTE — TELEPHONE ENCOUNTER
Called and spoke with Alex Keys at [facility].  Call was transferred to nurse- left message to return call regarding what is exactly needed for patient

## 2023-07-26 ENCOUNTER — TELEPHONE (OUTPATIENT)
Dept: FAMILY MEDICINE CLINIC | Age: 73
End: 2023-07-26

## 2023-07-26 NOTE — TELEPHONE ENCOUNTER
There is already a telephone encounter in regarding this concern. I was under the impression that her hematologist had ordered her Reclast injections in the past, as well as blood work. Staff has left a message with hematology office requesting a call back to discuss what all is needed on our end. Please advise.   Thanks    Otto Khoury,

## 2023-07-26 NOTE — TELEPHONE ENCOUNTER
Patient called stating that she needs prior authorization and an order for her next Reclast injection which will be due in September. She received her last injection at [facility] and her previous PCP ordered it. Patient states that she will need lab work prior as well as a dexa scan. Please advise. Thank you.

## 2023-07-26 NOTE — TELEPHONE ENCOUNTER
Call [facility] and find out who order the reclast and labs from prior years. Dr. Faith Chew did not order in the past patient is unsure who ordered. She states she has seen Dr. Gisell Lucero @ [facility] in the past. He may have records as well.

## 2023-07-26 NOTE — TELEPHONE ENCOUNTER
Spoke with cancer center and they had no idea what I was talking about, stated amaris does not follow with them. Going to call amaris and find out what exact doctor she follows.

## 2023-07-26 NOTE — TELEPHONE ENCOUNTER
Patient called stating that she needs prior authorization and an order for her next Reclast injection which will be due in September. She received her last injection at [facility] and her previous PCP ordered it. Patient states that she will need lab work prior as well as a dexa scan. Please advise. Thank you. There is already a telephone encounter in regarding this concern. I was under the impression that her hematologist had ordered her Reclast injections in the past, as well as blood work. Staff has left a message with hematology office requesting a call back to discuss what all is needed on our end. Please advise.   Thanks     Dieudonne Zhu,

## 2023-07-27 NOTE — TELEPHONE ENCOUNTER
Spoke with 's office and they stated the last reclast they have for her is Jan 2021. They fired her from their practice and haven't seen her since.

## 2023-07-27 NOTE — TELEPHONE ENCOUNTER
Can we also get records from Dr. Jeannette Ayers -- specifically how long she has been on Reclast, previous DEXA scan results? Patient will also likely need updated DEXA scan ordered before I order any medication/treatment so that I know her current bone density status. Is patient agreeable to have this completed?     Isauro Lim, DO

## 2023-07-27 NOTE — TELEPHONE ENCOUNTER
Sent fax for previous dexa scan  Called Dr. Foster Heady office   [phone]- office is currently closed.

## 2023-07-27 NOTE — TELEPHONE ENCOUNTER
Spoke with patient - she states that she spoke with Dion Fountain- Had reclast done in September of last year.  They are going to fax us the report on when she had it done last.    She will then need a new order to have done again in September     [address]  Phone - [phone]

## 2023-07-28 NOTE — TELEPHONE ENCOUNTER
Spoke to Paco Chaves @ [facility] [phone]. She is going to do some digging to see when/where and who ordered the reclast and get back with us.

## 2023-07-28 NOTE — TELEPHONE ENCOUNTER
Received call from Lesli Chang stating that pts last reclast infusion was 03/04/2022, ordered by ISAÍAS Rizvi      Office notes from [facility] scanned into media.

## 2023-07-31 NOTE — TELEPHONE ENCOUNTER
Notes from cancer center do not include last DEXA scan results. Regardless, patient will need updated DEXA scan prior to deciding if she still qualifies for reclast vs other treatment for osteoporosis. I will place order for her and it can be faxed to patient's facility of choice. Once DEXA is resulted, I can determine if she still needs reclast or if another treatment option would be better for her.      Rory De La Cruz, DO

## 2023-08-09 DIAGNOSIS — F39 MOOD DISORDER (HCC): ICD-10-CM

## 2023-08-09 RX ORDER — SERTRALINE HYDROCHLORIDE 25 MG/1
TABLET, FILM COATED ORAL
Qty: 135 TABLET | Refills: 1 | Status: SHIPPED | OUTPATIENT
Start: 2023-08-09

## 2023-08-09 NOTE — TELEPHONE ENCOUNTER
Patient's last appointment was : 6/28/2023  Patient's next appointment is : 12/28/2023  Last refilled:5/11/23, #135, 1 refill.

## 2023-08-31 ENCOUNTER — COMMUNITY OUTREACH (OUTPATIENT)
Dept: FAMILY MEDICINE CLINIC | Age: 73
End: 2023-08-31

## 2023-09-11 ENCOUNTER — TELEPHONE (OUTPATIENT)
Dept: FAMILY MEDICINE CLINIC | Age: 73
End: 2023-09-11

## 2023-09-11 NOTE — TELEPHONE ENCOUNTER
Received incomplete RADHA request.  Called new office- they will be faxing complete RADHA info- once received will fax records    ( Everything is printed and ready to go )     Called patient- no answer- left message to return call

## 2023-09-11 NOTE — TELEPHONE ENCOUNTER
----- Message from Skyrichar Surinder sent at 9/11/2023  2:38 PM EDT -----  Subject: Message to Provider    QUESTIONS  Information for Provider? Patient called would like to know how long would   it be before medical records could be fax over to Dr. Trice Hannon over at [facility], patient stated she's trying to schedule appointment with Dr. Trice Hannon,   patient would like to Thank Dr. Jesus Kennedy for all her services that she provided   for her  ---------------------------------------------------------------------------  --------------  Lillie Modesto Samuel  [phone]; OK to leave message on voicemail  ---------------------------------------------------------------------------  --------------  SCRIPT ANSWERS  Relationship to Patient?  Self

## 2023-09-12 NOTE — TELEPHONE ENCOUNTER
Patient notified that we are waiting on the sign RADHA form that was not received- voiced understanding-  will await signed form

## 2023-09-14 ENCOUNTER — TELEPHONE (OUTPATIENT)
Dept: FAMILY MEDICINE CLINIC | Age: 73
End: 2023-09-14

## 2023-09-14 NOTE — TELEPHONE ENCOUNTER
----- Message from Ale Rangel sent at 9/13/2023  3:23 PM EDT -----  Subject: Message to Provider    QUESTIONS  Information for Provider? Patient would like to inform office staff that   due to Insurance not in network she needs medical records transferred over   to [address] [place], [place] [address] [address] [address] [facility] [address] [place] [address] Sumit Fitch MD  ---------------------------------------------------------------------------  --------------  [address]  [phone]; OK to leave message on voicemail  ---------------------------------------------------------------------------  --------------  SCRIPT ANSWERS  Relationship to Patient?  Self

## 2023-09-19 ENCOUNTER — TELEPHONE (OUTPATIENT)
Dept: FAMILY MEDICINE CLINIC | Age: 73
End: 2023-09-19

## 2023-09-19 DIAGNOSIS — Z78.0 POST-MENOPAUSAL: ICD-10-CM

## 2023-09-19 DIAGNOSIS — M81.0 AGE-RELATED OSTEOPOROSIS WITHOUT CURRENT PATHOLOGICAL FRACTURE: ICD-10-CM

## 2023-09-19 NOTE — TELEPHONE ENCOUNTER
Since patient is now in osteopenia range, rather than osteoporosis, I will defer this to her new PCP -- if they decide that they want to manage reclast infusions, then they can place this order when she establishes with them. We can also fax DEXA scan results to new PCP office in the meantime if needed.  Thanks    Ruby Cheng DO

## 2023-09-19 NOTE — TELEPHONE ENCOUNTER
----- Message from Prudence Amaya sent at 9/19/2023 12:26 PM EDT -----  Subject: Message to Provider    QUESTIONS  Information for Provider? Patient wants to get a lab done and reclasp   before Oct 1 she has some questions before she leaves on Oct 1 call   patient back on this.   ---------------------------------------------------------------------------  --------------  Lillie Whittier Samuel  [phone]; Do not leave any message, patient will call back for answer  ---------------------------------------------------------------------------  --------------  SCRIPT ANSWERS  Relationship to Patient?  Self

## 2023-09-19 NOTE — TELEPHONE ENCOUNTER
Pt is requesting reclast, called Richard Triplett with [facility] medical records she is faxing over results for Bone Density Scan

## 2023-09-19 NOTE — TELEPHONE ENCOUNTER
Pt states that she is switching providers 10/1 and would like provider to order reclast before she leaves. Please call patient and let her know that her DEXA scan showed osteopenia. Is she following with me or another PCP now?      Dr. Panchito Pimentel

## 2024-01-28 DIAGNOSIS — F39 MOOD DISORDER (HCC): ICD-10-CM

## 2024-01-29 RX ORDER — SERTRALINE HYDROCHLORIDE 25 MG/1
TABLET, FILM COATED ORAL
Qty: 135 TABLET | Refills: 1 | OUTPATIENT
Start: 2024-01-29